# Patient Record
Sex: MALE | Race: BLACK OR AFRICAN AMERICAN | NOT HISPANIC OR LATINO | Employment: OTHER | URBAN - METROPOLITAN AREA
[De-identification: names, ages, dates, MRNs, and addresses within clinical notes are randomized per-mention and may not be internally consistent; named-entity substitution may affect disease eponyms.]

---

## 2018-06-04 ENCOUNTER — TRANSCRIBE ORDERS (OUTPATIENT)
Dept: ADMINISTRATIVE | Facility: HOSPITAL | Age: 68
End: 2018-06-04

## 2018-06-04 DIAGNOSIS — Z87.891 PERSONAL HISTORY OF TOBACCO USE, PRESENTING HAZARDS TO HEALTH: Primary | ICD-10-CM

## 2021-07-07 ENCOUNTER — TRANSCRIBE ORDERS (OUTPATIENT)
Dept: ADMINISTRATIVE | Facility: HOSPITAL | Age: 71
End: 2021-07-07

## 2021-07-07 DIAGNOSIS — Z87.891 PERSONAL HISTORY OF TOBACCO USE, PRESENTING HAZARDS TO HEALTH: Primary | ICD-10-CM

## 2021-07-21 ENCOUNTER — HOSPITAL ENCOUNTER (OUTPATIENT)
Dept: ULTRASOUND IMAGING | Facility: HOSPITAL | Age: 71
Discharge: HOME | End: 2021-07-21
Admitting: NURSE PRACTITIONER

## 2021-07-21 DIAGNOSIS — Z87.891 PERSONAL HISTORY OF TOBACCO USE, PRESENTING HAZARDS TO HEALTH: ICD-10-CM

## 2021-07-21 PROCEDURE — 76706 US ABDL AORTA SCREEN AAA: CPT

## 2024-07-31 ENCOUNTER — APPOINTMENT (OUTPATIENT)
Dept: CT IMAGING | Facility: HOSPITAL | Age: 74
End: 2024-07-31
Payer: COMMERCIAL

## 2024-07-31 ENCOUNTER — HOSPITAL ENCOUNTER (EMERGENCY)
Facility: HOSPITAL | Age: 74
Discharge: HOME | End: 2024-07-31
Attending: EMERGENCY MEDICINE
Payer: COMMERCIAL

## 2024-07-31 VITALS
HEART RATE: 74 BPM | DIASTOLIC BLOOD PRESSURE: 73 MMHG | SYSTOLIC BLOOD PRESSURE: 117 MMHG | RESPIRATION RATE: 20 BRPM | BODY MASS INDEX: 28.64 KG/M2 | TEMPERATURE: 97.9 F | HEIGHT: 68 IN | WEIGHT: 189 LBS | OXYGEN SATURATION: 93 %

## 2024-07-31 DIAGNOSIS — K62.89 ACUTE PROCTITIS: Primary | ICD-10-CM

## 2024-07-31 DIAGNOSIS — K62.5 RECTAL BLEEDING: ICD-10-CM

## 2024-07-31 DIAGNOSIS — R10.84 GENERALIZED ABDOMINAL PAIN: ICD-10-CM

## 2024-07-31 LAB
ALBUMIN SERPL-MCNC: 4.2 G/DL (ref 3.5–5.2)
ALBUMIN/GLOB SERPL: 1.5 G/DL
ALP SERPL-CCNC: 71 U/L (ref 39–117)
ALT SERPL W P-5'-P-CCNC: 12 U/L (ref 1–41)
ANION GAP SERPL CALCULATED.3IONS-SCNC: 13 MMOL/L (ref 5–15)
AST SERPL-CCNC: 15 U/L (ref 1–40)
BASOPHILS # BLD AUTO: 0.04 10*3/MM3 (ref 0–0.2)
BASOPHILS NFR BLD AUTO: 0.7 % (ref 0–1.5)
BILIRUB SERPL-MCNC: 0.4 MG/DL (ref 0–1.2)
BUN SERPL-MCNC: 13 MG/DL (ref 8–23)
BUN/CREAT SERPL: 10.2 (ref 7–25)
CALCIUM SPEC-SCNC: 9.4 MG/DL (ref 8.6–10.5)
CHLORIDE SERPL-SCNC: 103 MMOL/L (ref 98–107)
CO2 SERPL-SCNC: 22 MMOL/L (ref 22–29)
CREAT SERPL-MCNC: 1.28 MG/DL (ref 0.76–1.27)
DEPRECATED RDW RBC AUTO: 40.5 FL (ref 37–54)
EGFRCR SERPLBLD CKD-EPI 2021: 59.1 ML/MIN/1.73
EOSINOPHIL # BLD AUTO: 0.22 10*3/MM3 (ref 0–0.4)
EOSINOPHIL NFR BLD AUTO: 4 % (ref 0.3–6.2)
ERYTHROCYTE [DISTWIDTH] IN BLOOD BY AUTOMATED COUNT: 13.8 % (ref 12.3–15.4)
GLOBULIN UR ELPH-MCNC: 2.8 GM/DL
GLUCOSE SERPL-MCNC: 108 MG/DL (ref 65–99)
HCT VFR BLD AUTO: 43.5 % (ref 37.5–51)
HGB BLD-MCNC: 14.8 G/DL (ref 13–17.7)
IMM GRANULOCYTES # BLD AUTO: 0.01 10*3/MM3 (ref 0–0.05)
IMM GRANULOCYTES NFR BLD AUTO: 0.2 % (ref 0–0.5)
INR PPP: 1.03 (ref 0.89–1.12)
LIPASE SERPL-CCNC: 24 U/L (ref 13–60)
LYMPHOCYTES # BLD AUTO: 1.23 10*3/MM3 (ref 0.7–3.1)
LYMPHOCYTES NFR BLD AUTO: 22.4 % (ref 19.6–45.3)
MAGNESIUM SERPL-MCNC: 2.2 MG/DL (ref 1.6–2.4)
MCH RBC QN AUTO: 27.5 PG (ref 26.6–33)
MCHC RBC AUTO-ENTMCNC: 34 G/DL (ref 31.5–35.7)
MCV RBC AUTO: 80.9 FL (ref 79–97)
MONOCYTES # BLD AUTO: 0.52 10*3/MM3 (ref 0.1–0.9)
MONOCYTES NFR BLD AUTO: 9.5 % (ref 5–12)
NEUTROPHILS NFR BLD AUTO: 3.46 10*3/MM3 (ref 1.7–7)
NEUTROPHILS NFR BLD AUTO: 63.2 % (ref 42.7–76)
NRBC BLD AUTO-RTO: 0 /100 WBC (ref 0–0.2)
PLATELET # BLD AUTO: 217 10*3/MM3 (ref 140–450)
PMV BLD AUTO: 8.8 FL (ref 6–12)
POTASSIUM SERPL-SCNC: 3.8 MMOL/L (ref 3.5–5.2)
PROT SERPL-MCNC: 7 G/DL (ref 6–8.5)
PROTHROMBIN TIME: 13.6 SECONDS (ref 12.2–14.5)
RBC # BLD AUTO: 5.38 10*6/MM3 (ref 4.14–5.8)
SODIUM SERPL-SCNC: 138 MMOL/L (ref 136–145)
WBC NRBC COR # BLD AUTO: 5.48 10*3/MM3 (ref 3.4–10.8)

## 2024-07-31 PROCEDURE — 85025 COMPLETE CBC W/AUTO DIFF WBC: CPT | Performed by: EMERGENCY MEDICINE

## 2024-07-31 PROCEDURE — 85610 PROTHROMBIN TIME: CPT | Performed by: EMERGENCY MEDICINE

## 2024-07-31 PROCEDURE — 36415 COLL VENOUS BLD VENIPUNCTURE: CPT

## 2024-07-31 PROCEDURE — 74176 CT ABD & PELVIS W/O CONTRAST: CPT

## 2024-07-31 PROCEDURE — 80053 COMPREHEN METABOLIC PANEL: CPT | Performed by: EMERGENCY MEDICINE

## 2024-07-31 PROCEDURE — 83690 ASSAY OF LIPASE: CPT | Performed by: EMERGENCY MEDICINE

## 2024-07-31 PROCEDURE — 83735 ASSAY OF MAGNESIUM: CPT | Performed by: EMERGENCY MEDICINE

## 2024-07-31 PROCEDURE — 99284 EMERGENCY DEPT VISIT MOD MDM: CPT

## 2024-07-31 RX ORDER — AMOXICILLIN AND CLAVULANATE POTASSIUM 875; 125 MG/1; MG/1
1 TABLET, FILM COATED ORAL 2 TIMES DAILY
Qty: 20 TABLET | Refills: 0 | Status: SHIPPED | OUTPATIENT
Start: 2024-07-31

## 2024-07-31 RX ORDER — AMOXICILLIN AND CLAVULANATE POTASSIUM 875; 125 MG/1; MG/1
1 TABLET, FILM COATED ORAL ONCE
Status: COMPLETED | OUTPATIENT
Start: 2024-07-31 | End: 2024-07-31

## 2024-07-31 RX ORDER — AMOXICILLIN 250 MG
2 CAPSULE ORAL NIGHTLY PRN
Qty: 14 TABLET | Refills: 0 | Status: SHIPPED | OUTPATIENT
Start: 2024-07-31 | End: 2024-08-07

## 2024-07-31 RX ORDER — SODIUM CHLORIDE 0.9 % (FLUSH) 0.9 %
10 SYRINGE (ML) INJECTION AS NEEDED
Status: DISCONTINUED | OUTPATIENT
Start: 2024-07-31 | End: 2024-07-31 | Stop reason: HOSPADM

## 2024-07-31 RX ORDER — DICYCLOMINE HCL 20 MG
20 TABLET ORAL EVERY 8 HOURS PRN
Qty: 30 TABLET | Refills: 0 | Status: SHIPPED | OUTPATIENT
Start: 2024-07-31

## 2024-07-31 RX ORDER — DICYCLOMINE HYDROCHLORIDE 10 MG/1
20 CAPSULE ORAL ONCE
Status: COMPLETED | OUTPATIENT
Start: 2024-07-31 | End: 2024-07-31

## 2024-07-31 RX ADMIN — DICYCLOMINE HYDROCHLORIDE 20 MG: 10 CAPSULE ORAL at 05:11

## 2024-07-31 RX ADMIN — AMOXICILLIN AND CLAVULANATE POTASSIUM 1 TABLET: 875; 125 TABLET, FILM COATED ORAL at 05:11

## 2024-07-31 NOTE — ED PROVIDER NOTES
Subjective   History of Present Illness  74 year old male presents to the emergency department with concerns about generalized abdominal pain with rectal bleeding and constipation. The constipation began first, and he saw his primary care provider regarding the constipation, and was advised to use over the counter medication which he did as directed. He states he took some of the medication yesterday evening before bed, but is not sure of the name of the medication. He states that he awoke around midnight with generalized abdominal cramping, and subsequently had four bowel movements. He noticed the stool had red blood on it with the second bowel movement, and a smaller amount of red blood was noticed on the third and fourth bowel movement. He states his generalized abdominal pain has resolved upon my initial evaluation. He denies use of anticoagulants. He denies recent vomiting or fever. He states he had a colonoscopy previously and was told the doctor was unable to complete the study due to some blockage. He denies perianal pain or acute urinary symptoms. He denies prior abdominal surgeries and denies use of any prescribed medications.      Review of Systems   Constitutional:  Negative for diaphoresis and fever.   HENT:  Negative for drooling and voice change.    Eyes:  Negative for photophobia and discharge.   Respiratory:  Negative for stridor.    Gastrointestinal:  Positive for abdominal pain, blood in stool and constipation. Negative for vomiting.   Genitourinary:  Negative for dysuria, hematuria and urgency.   Neurological:  Negative for facial asymmetry and speech difficulty.       No past medical history on file. Takes no prescribed medications    No Known Allergies    No past surgical history on file. Denies prior abdominal surgeries    No family history on file.    Social History     Socioeconomic History    Marital status: Single           Objective   Physical Exam  Vitals and nursing note reviewed.  "  Constitutional:       General: He is not in acute distress.     Appearance: He is not diaphoretic.      Comments: BMI 28.   HENT:      Mouth/Throat:      Comments: Moist mucosa without pallor.  Eyes:      General: No scleral icterus.     Comments: No photophobia or discharge.   Pulmonary:      Effort: Pulmonary effort is normal. No respiratory distress.      Breath sounds: No stridor.   Abdominal:      General: There is no distension.      Palpations: Abdomen is soft.      Tenderness: There is no abdominal tenderness. There is no guarding.   Genitourinary:     Rectum: Guaiac result negative.      Comments: Small nontender nonthrombosed external hemorrhoid. No fissure appreciated.   Musculoskeletal:      Cervical back: Neck supple. No rigidity.   Skin:     General: Skin is warm and dry.   Neurological:      Mental Status: He is alert.      Comments: Normal speech, no dysarthria.                    ED Course  ED Course as of 07/31/24 0439   Wed Jul 31, 2024   0433 117/73 [LD]      ED Course User Index  [LD] Iris Resendez MD                                 Prior to discharge, results and plan discussed with the patient. All questions addressed. Unremarkable ED course with no further episodes of rectal bleeding. I discussed with the patient the information in the body of the report of his CT abdomen stating: \"Multiple small bowel loops closely adherent to the ventral abdominal wall possibly indicating adhesions.\" Without prior abdominal surgical history this would be unusual, but may account for the reason his colonoscopy was technically difficult and limited per his report. At this time he appears to be stable for further outpatient evaluation.             Medical Decision Making  Differential diagnosis includes proctitis, colitis, diverticular lower gi bleeding, internal hemorrhoid, anemia, and others.    Problems Addressed:  Acute proctitis: complicated acute illness or injury  Generalized abdominal pain: " complicated acute illness or injury  Rectal bleeding: complicated acute illness or injury    Amount and/or Complexity of Data Reviewed  External Data Reviewed:      Details: No prior creatinine value available electronically for immediate comparison.   Labs: ordered. Decision-making details documented in ED Course.  Radiology: ordered. Decision-making details documented in ED Course.    Risk  OTC drugs.  Prescription drug management.      Recent Results (from the past 24 hour(s))   Comprehensive Metabolic Panel    Collection Time: 07/31/24  2:51 AM    Specimen: Blood   Result Value Ref Range    Glucose 108 (H) 65 - 99 mg/dL    BUN 13 8 - 23 mg/dL    Creatinine 1.28 (H) 0.76 - 1.27 mg/dL    Sodium 138 136 - 145 mmol/L    Potassium 3.8 3.5 - 5.2 mmol/L    Chloride 103 98 - 107 mmol/L    CO2 22.0 22.0 - 29.0 mmol/L    Calcium 9.4 8.6 - 10.5 mg/dL    Total Protein 7.0 6.0 - 8.5 g/dL    Albumin 4.2 3.5 - 5.2 g/dL    ALT (SGPT) 12 1 - 41 U/L    AST (SGOT) 15 1 - 40 U/L    Alkaline Phosphatase 71 39 - 117 U/L    Total Bilirubin 0.4 0.0 - 1.2 mg/dL    Globulin 2.8 gm/dL    A/G Ratio 1.5 g/dL    BUN/Creatinine Ratio 10.2 7.0 - 25.0    Anion Gap 13.0 5.0 - 15.0 mmol/L    eGFR 59.1 (L) >60.0 mL/min/1.73   Lipase    Collection Time: 07/31/24  2:51 AM    Specimen: Blood   Result Value Ref Range    Lipase 24 13 - 60 U/L   Magnesium    Collection Time: 07/31/24  2:51 AM    Specimen: Blood   Result Value Ref Range    Magnesium 2.2 1.6 - 2.4 mg/dL   CBC Auto Differential    Collection Time: 07/31/24  2:51 AM    Specimen: Blood   Result Value Ref Range    WBC 5.48 3.40 - 10.80 10*3/mm3    RBC 5.38 4.14 - 5.80 10*6/mm3    Hemoglobin 14.8 13.0 - 17.7 g/dL    Hematocrit 43.5 37.5 - 51.0 %    MCV 80.9 79.0 - 97.0 fL    MCH 27.5 26.6 - 33.0 pg    MCHC 34.0 31.5 - 35.7 g/dL    RDW 13.8 12.3 - 15.4 %    RDW-SD 40.5 37.0 - 54.0 fl    MPV 8.8 6.0 - 12.0 fL    Platelets 217 140 - 450 10*3/mm3    Neutrophil % 63.2 42.7 - 76.0 %    Lymphocyte %  22.4 19.6 - 45.3 %    Monocyte % 9.5 5.0 - 12.0 %    Eosinophil % 4.0 0.3 - 6.2 %    Basophil % 0.7 0.0 - 1.5 %    Immature Grans % 0.2 0.0 - 0.5 %    Neutrophils, Absolute 3.46 1.70 - 7.00 10*3/mm3    Lymphocytes, Absolute 1.23 0.70 - 3.10 10*3/mm3    Monocytes, Absolute 0.52 0.10 - 0.90 10*3/mm3    Eosinophils, Absolute 0.22 0.00 - 0.40 10*3/mm3    Basophils, Absolute 0.04 0.00 - 0.20 10*3/mm3    Immature Grans, Absolute 0.01 0.00 - 0.05 10*3/mm3    nRBC 0.0 0.0 - 0.2 /100 WBC   Protime-INR    Collection Time: 07/31/24  2:51 AM    Specimen: Blood   Result Value Ref Range    Protime 13.6 12.2 - 14.5 Seconds    INR 1.03 0.89 - 1.12     Note: In addition to lab results from this visit, the labs listed above may include labs taken at another facility or during a different encounter within the last 24 hours. Please correlate lab times with ED admission and discharge times for further clarification of the services performed during this visit.    CT Abdomen Pelvis Without Contrast   Final Result   Impression:   1.Rectal wall thickening that could correspond to proctitis.   2.Small hiatal hernia.   3.Multiple hepatic and kidney cysts.   4.Thoracolumbar degenerative changes.   5.Moderate right and mild left scrotal hydrocele.            Electronically Signed: Tony Conti MD     7/31/2024 3:15 AM EDT     Workstation ID: [id number]        Vitals:    07/31/24 0230 07/31/24 0300 07/31/24 0330 07/31/24 0400   BP: 133/86 134/69 140/99 131/72   BP Location:       Patient Position:       Pulse: 76 74 72 73   Resp:       Temp:       TempSrc:       SpO2: 94% 95% 95% 95%   Weight:       Height:         Medications   sodium chloride 0.9 % flush 10 mL (has no administration in time range)   amoxicillin-clavulanate (AUGMENTIN) 875-125 MG per tablet 1 tablet (has no administration in time range)   dicyclomine (BENTYL) capsule 20 mg (has no administration in time range)     ECG/EMG Results (last 24 hours)       ** No results found for  the last 24 hours. **          No orders to display           Final diagnoses:   Acute proctitis   Rectal bleeding   Generalized abdominal pain       ED Disposition  ED Disposition       ED Disposition   Discharge    Condition   Stable    Comment   --               Merlin Harper MD  [address] [facility]  [address]  [place] [id number]  [phone]    Schedule an appointment as soon as possible for a visit in 3 days  [facility]    Reed Sequeira MD  [address]   [address]  [facility] [address]  [phone]    Schedule an appointment as soon as possible for a visit in 1 week  This is a gastroenterologist or you can follow up with your gastroenterologist for possible colonoscopy.         Medication List        New Prescriptions      amoxicillin-clavulanate 875-125 MG per tablet  Commonly known as: AUGMENTIN  Take 1 tablet by mouth 2 (Two) Times a Day.     dicyclomine 20 MG tablet  Commonly known as: BENTYL  Take 1 tablet by mouth Every 8 (Eight) Hours As Needed for Abdominal Cramping.     sennosides-docusate 8.6-50 MG per tablet  Commonly known as: Senna S  Take 2 tablets by mouth At Night As Needed for Constipation for up to 7 days. PRN constipation               Where to Get Your Medications        These medications were sent to [facility] [address] [place] [address] - [address] [facility] - [phone]  - [phone]   [address] [place] [id number]      Phone: [phone]   amoxicillin-clavulanate 875-125 MG per tablet  dicyclomine 20 MG tablet  sennosides-docusate 8.6-50 MG per tablet            Iris Resendez MD  08/03/24 5708

## 2025-08-21 ENCOUNTER — TELEPHONE (OUTPATIENT)
Dept: RADIATION ONCOLOGY | Facility: HOSPITAL | Age: 75
End: 2025-08-21
Payer: COMMERCIAL

## 2025-08-21 ENCOUNTER — OFFICE VISIT (OUTPATIENT)
Age: 75
End: 2025-08-21
Payer: MEDICARE

## 2025-08-21 VITALS
SYSTOLIC BLOOD PRESSURE: 116 MMHG | WEIGHT: 190.3 LBS | OXYGEN SATURATION: 98 % | BODY MASS INDEX: 29.87 KG/M2 | HEIGHT: 67 IN | DIASTOLIC BLOOD PRESSURE: 82 MMHG | RESPIRATION RATE: 20 BRPM | HEART RATE: 98 BPM

## 2025-08-21 DIAGNOSIS — C61 PROSTATE CANCER: Primary | ICD-10-CM

## 2025-08-21 PROCEDURE — G0463 HOSPITAL OUTPT CLINIC VISIT: HCPCS

## 2025-08-21 RX ORDER — ATORVASTATIN CALCIUM 20 MG/1
20 TABLET, FILM COATED ORAL
COMMUNITY
End: 2025-08-21

## 2025-08-21 RX ORDER — AMOXICILLIN 250 MG
CAPSULE ORAL
COMMUNITY

## 2025-08-29 ENCOUNTER — HOSPITAL ENCOUNTER (OUTPATIENT)
Facility: HOSPITAL | Age: 75
Discharge: HOME | End: 2025-08-29
Payer: MEDICARE

## 2025-08-29 DIAGNOSIS — C61 PROSTATE CANCER: ICD-10-CM

## 2025-08-29 PROCEDURE — 34310000005 GALLIUM GA 68 GOZETOTIDE 25 MCG KIT: Performed by: RADIOLOGY

## 2025-08-29 PROCEDURE — 78815 PET IMAGE W/CT SKULL-THIGH: CPT

## 2025-08-29 PROCEDURE — A9596 GALLIUM GA 68 GOZETOTIDE 25 MCG KIT: HCPCS | Performed by: RADIOLOGY

## 2025-08-29 RX ADMIN — KIT FOR THE PREPARATION OF GALLIUM GA 68 GOZETOTIDE INJECTION 1 DOSE: KIT INTRAVENOUS at 13:08
